# Patient Record
Sex: MALE | Race: OTHER | Employment: OTHER | URBAN - METROPOLITAN AREA
[De-identification: names, ages, dates, MRNs, and addresses within clinical notes are randomized per-mention and may not be internally consistent; named-entity substitution may affect disease eponyms.]

---

## 2017-01-14 ENCOUNTER — OFFICE VISIT (OUTPATIENT)
Dept: FAMILY MEDICINE CLINIC | Age: 22
End: 2017-01-14

## 2017-01-14 VITALS
HEART RATE: 72 BPM | HEIGHT: 60 IN | BODY MASS INDEX: 29.64 KG/M2 | TEMPERATURE: 98.6 F | SYSTOLIC BLOOD PRESSURE: 134 MMHG | DIASTOLIC BLOOD PRESSURE: 80 MMHG | WEIGHT: 151 LBS

## 2017-01-14 DIAGNOSIS — L70.0 PUSTULAR ACNE: Primary | ICD-10-CM

## 2017-01-14 RX ORDER — DOXYCYCLINE 100 MG/1
100 CAPSULE ORAL 2 TIMES DAILY
Qty: 60 CAP | Refills: 1 | Status: SHIPPED | OUTPATIENT
Start: 2017-01-14 | End: 2017-02-13

## 2017-01-14 NOTE — PATIENT INSTRUCTIONS
Benzoyl Peroxide (Para la piel)   Se [place] en la piel para tratar acné y otros problemas de la piel. Mio(s):Acne Foaming Face Wash, Acne Medication 10, Acne Medication 5, Acne-10, Acne-5, BPO 4% Creamy Wash Complete Pack, BPO 4% Gel, BPO 8% Creamy Wash Complete Pack, BPO 8% Gel, BPO Foaming Cloths, BPO-10, BPO-5, BenzEFoam, BenzEFoam Ultra, Benzac AC   Existen muchas otras marcas de ImaCor. Paty medicamento no debe ser usado cuando:   Paty medicamento no es adecuado para todas las personas. No lo use si usted ha tenido hussain reacción alérgica al peróxido de benzoilo. Himrod de usar paty medicamento:   [place], [place], [place], [facility], [place], [place], [facility]  · Cuando empiece a utilizar paty producto, apláquelo en 1 o 2 zonas afectadas pequeñas de la piel por 3 días. Si no ocurre [place], siga las instrucciones de la etiqueta del producto o utilice el medicamento bety se lo indique danielson médico.  · Paty medicamento es para usar únicamente en la piel. Lave el área de la piel inmediatamente que tenga cortadas o raspaduras. No permita que [employer] con shawn ojos, nariz o boca. · Dosis olvidada: Aplique la dosis lo más pronto posible. Si es ronnell hora para danielson próxima dosis, espere hasta entonces para aplicar shawn dosis regular. No aplique medicamento adicional para reponer la dosis olvidada. · Guarde el medicamento en un recipiente cerrado bajo temperatura ambiental, alejado del calor, la humedad y la asif directa. No lo congele. Medicamentos y [employer] que debe evitar:      Consulte con danielson médico o farmacéutico antes de usar cualquier medicamento, incluyendo los que compra sin receta médica, las vitaminas y los productos herbales. Precauciones imani el uso de paty medicamento:   · Informe a danielson médico si usted está embarazada o dando de lactar, o si alguna vez tuvo hussain reacción alérgica a un producto para el acné. · Paty medicamento puede decolorar el richmond o la ropa.   · Lori Pi medicamento puede hacer que danielson piel sea más sensible a la asif solar. Use un protector solar. Evite las lámparas y cámaras bronceadoras. · Llame a danielson médico si shawn síntomas no mejoran, o si Molly Bold. · Guarde todos los medicamentos fuera del alcance de los niños y nunca comparta shawn medicamentos con otras personas. Efectos secundarios que pueden presentarse imani el uso de vannesa medicamento:   Consulte inmediatamente con el médico si nota cualquiera de estos efectos secundarios:  · Reacción alérgica: picazón o ronchas, hinchazón en la nando o en las rosendo, hinchazón o cosquilleo en la boca o garganta, opresión en el pecho, dificultad para respirar  · Ardor, ampollas, hinchazón o descamación. · Desmayo  · Inflamación de los ojos, nando, labios o lengua  Consulte con el médico si nota los siguientes efectos secundarios menos graves:   · Ardor leve, picazón, enrojecimiento o sequedad de la piel  Consulte con el médico si nota otros efectos secundarios que chad son causados por vannesa medicamento. [place] a danielson médico para consejo médico sobre efectos secundarios. Usted puede reportar shawn efectos secundarios al FDA al [phone]. © 2016 [address] is for End User's use only and may not be sold, redistributed or otherwise used for commercial purposes. Esta información es sólo para uso en educación. Danielson intención no es darle un consejo médico sobre enfermedades o tratamientos. Colsulte con danielson Sonjia Reas farmacéutico antes de seguir cualquier régimen médico para saber si es seguro y efectivo para usted. Acné: Instrucciones de cuidado - [ Acne: Care Instructions ]  Instrucciones de cuidado  El acné es un problema de la piel que se manifiesta bety espinillas negras, puntos blancos y Jose Armando Charlene. Afecta con mayor frecuencia la nando, el jarett y la parte superior del cuerpo. El acné ocurre cuando la grasa y las células muertas de la piel ranjit los poros de la piel.   Por lo general, el acné comienza imani la adolescencia y, con frecuencia, dura hasta la edad [id number]. Hussain limpieza [employer] días controla la mayoría de los casos de acné leve. Si el tratamiento en casa no funciona, el médico podría recetar cremas, antibióticos o un medicamento más taqueria llamado isotretinoína. A veces las píldoras anticonceptivas ayudan a las mujeres que tienen un agravamiento mensual del acné. La atención de seguimiento es hussain parte clave de danielson tratamiento y seguridad. Asegúrese de hacer y acudir a todas las citas, y llame a danielson médico si está teniendo problemas. También es hussain buena idea saber los resultados de los exámenes y mantener hussain lista de los medicamentos que margaux. ¿Cómo puede cuidarse en el hogar? · Lávese la nando con suavidad 1 ó 2 veces al día con agua tibia (no caliente) y un jabón o limpiador suave. Siempre enjuáguese krys. · Use hussain loción o gel de venta cassi que contenga peróxido de benzoilo. Comience con hussain pequeña cantidad de peróxido de benzoilo al 2.5% y aumente la concentración según lo necesite. El peróxido de benzoilo funciona krys contra el acné, alireza blair vez tenga que usarlo hasta por 2 meses antes de que danielson acné comience a mejorar. · Aplíquese crema, loción o gel contra el acné en todos los lugares donde le salgan lisa, espinillas negras o puntos blancos, no sólo donde los tenga en vannesa momento. Siga las instrucciones atentamente. Si la piel se le seca y se descama demasiado o se enrojece y le duele, reduzca la cantidad. Para obtener los [place], aplíquese los medicamentos según las indicaciones. Trate de no saltarse ninguna dosis. · No se apriete ni se saque los lisa y las espinillas negras. [place] puede causar infección y cicatrices. · Use sólo maquillaje, protector solar y otros productos para el cuidado de la piel que no contengan aceite y no tapen los poros.   · Lávese el richmond a diario y trate de mantenerlo alejado de la nando y los hombros. Considere recogerse o cortarse el richmond. ¿Cuándo debe pedir ayuda? Preste especial atención a los cambios en danielson ghassan y asegúrese de comunicarse con danielson médico si:  · Ha intentado el tratamiento en casa por 6 a 8 semanas y danielson acné no mejora, o [id number]. Es posible que el médico necesite modificar danielson tratamiento. · Karina lisa se vuelven grandes y duros o se llenan de líquido. · Se madeleine cicatrices tras sanarse los lisa. · Se siente bozena o desesperanzado, le falta energía o tiene otras señales de depresión mientras margaux el medicamento recetado isotretinoína. · Comienza a tener otros síntomas, tales bety crecimiento de vello facial en las mujeres o dolor en los huesos y los músculos. ¿Dónde puede encontrar más información en inglés? Gennaro Safer a http://jigar-jeromy.info/. Escriba V108 en la búsqueda para aprender más acerca de \"Acné: Instrucciones de cuidado - [ Acne: Care Instructions ]. \"  Revisado: 5 febrero, 2016  Versión del contenido: 11.1  © 5822-1505 Healthwise, Incorporated. Las instrucciones de cuidado fueron adaptadas bajo licencia por [employer] (which disclaims liability or warranty for this information). Si usted tiene [place] afección médica o sobre estas instrucciones, siempre pregunte a danielson profesional de ghassan. Healthwise, Incorporated niega toda garantía o responsabilidad por danielson uso de esta información.

## 2017-01-14 NOTE — PROGRESS NOTES
Printed AVS, provided to pt and reviewed. Pt indicated understanding and had no questions. Told pt that rx's have been sent to pharmacy and they should be ready for  in approximately 2 hrs.reviewed all ordered medicaitons with the pt including the non- rx cream. Please present GoodRx. com coupon which we provide to your pharmacy to receive discounted gates. Candice Austin was the .  Ranjana Bowman RN

## 2017-01-14 NOTE — PROGRESS NOTES
Subjective:     Chief Complaint   Patient presents with    Acne        He  is a 24 y.o. male who presents for evaluation of acne. Original onset approx 4 years ago. Attempted relief with coconut oil, soaps and Abx injections. Notes poor relief from all above attempted remedies. Is not currently using any OTC creams/soaps. PMH: denies     Surg: denies     NKDA    ROS  Gen - no fever/chills  Resp - no dyspnea or cough  CV - no chest pain or ANDRE  Rest per HPI    No past medical history on file. No past surgical history on file. No current outpatient prescriptions on file prior to visit. No current facility-administered medications on file prior to visit. Objective:     Vitals:    01/14/17 1041   BP: 134/80   Pulse: 72   Temp: 98.6 °F (37 °C)   TempSrc: Oral   Weight: 151 lb (68.5 kg)   Height: 4' 11.84\" (1.52 m)       Physical Examination:  General appearance - alert, well appearing, and in no distress  Eyes -sclera anicteric  Neck - supple, no significant adenopathy, no thyromegaly  Chest - clear to auscultation, no wheezes, rales or rhonchi, symmetric air entry  Heart - normal rate, regular rhythm, normal S1, S2, no murmurs, rubs, clicks or gallops  Neurological - alert, oriented, no focal findings or movement disorder noted  Widespread comedic, pustular acne on face          L cheek      Assessment/ Plan:   Jensen Monet was seen today for acne. Diagnoses and all orders for this visit:    Pustular acne  -     doxycycline (VIBRAMYCIN) 100 mg capsule; Take 1 Cap by mouth two (2) times a day for 30 days. Start OTC benzoyl peroxide and face cream.     Given cost of Retin, will defer pending re-eval. Recommend Pt taper Abx at one month and see how face wash and cream maintains his acne. If it starts to worsen, resume PO Abx one cap daily. Re-eval in 6-8 weeks. Discussed cost of Abx and Pt was in agreement for [facility] at [facility].      I have discussed the diagnosis with the patient and the intended plan as seen in the above orders. The patient has received an after-visit summary and questions were answered concerning future plans. I have discussed medication side effects and warnings with the patient as well. The patient verbalizes understanding and agreement with the plan. Follow-up Disposition:  Return in about 2 months (around 3/14/2017), or if symptoms worsen or fail to improve.

## 2017-07-06 ENCOUNTER — HOSPITAL ENCOUNTER (EMERGENCY)
Age: 22
Discharge: HOME | End: 2017-07-06
Attending: EMERGENCY MEDICINE
Payer: SELF-PAY

## 2017-07-06 VITALS
OXYGEN SATURATION: 99 % | HEART RATE: 76 BPM | SYSTOLIC BLOOD PRESSURE: 138 MMHG | WEIGHT: 146 LBS | DIASTOLIC BLOOD PRESSURE: 97 MMHG | TEMPERATURE: 100.2 F | RESPIRATION RATE: 24 BRPM

## 2017-07-06 DIAGNOSIS — N34.2 URETHRITIS: Primary | ICD-10-CM

## 2017-07-06 LAB
APPEARANCE UR: CLEAR
BACTERIA URNS QL MICRO: NEGATIVE /HPF
BILIRUB UR QL: NEGATIVE
COLOR UR: YELLOW
EPITH CASTS URNS QL MICRO: NEGATIVE /LPF (ref 0–5)
GLUCOSE UR STRIP.AUTO-MCNC: NEGATIVE MG/DL
HGB UR QL STRIP: NEGATIVE
KETONES UR QL STRIP.AUTO: ABNORMAL MG/DL
LEUKOCYTE ESTERASE UR QL STRIP.AUTO: NEGATIVE
MUCOUS THREADS URNS QL MICRO: ABNORMAL /LPF
NITRITE UR QL STRIP.AUTO: NEGATIVE
PH UR STRIP: 7 [PH] (ref 5–8)
PROT UR STRIP-MCNC: ABNORMAL MG/DL
RBC #/AREA URNS HPF: ABNORMAL /HPF (ref 0–5)
SP GR UR REFRACTOMETRY: >1.03 (ref 1–1.03)
UROBILINOGEN UR QL STRIP.AUTO: 1 EU/DL (ref 0.2–1)
WBC URNS QL MICRO: ABNORMAL /HPF (ref 0–4)

## 2017-07-06 PROCEDURE — 74011250637 HC RX REV CODE- 250/637: Performed by: EMERGENCY MEDICINE

## 2017-07-06 PROCEDURE — 87081 CULTURE SCREEN ONLY: CPT | Performed by: EMERGENCY MEDICINE

## 2017-07-06 PROCEDURE — 99283 EMERGENCY DEPT VISIT LOW MDM: CPT

## 2017-07-06 PROCEDURE — 96372 THER/PROPH/DIAG INJ SC/IM: CPT

## 2017-07-06 PROCEDURE — 81001 URINALYSIS AUTO W/SCOPE: CPT | Performed by: EMERGENCY MEDICINE

## 2017-07-06 PROCEDURE — 87491 CHLMYD TRACH DNA AMP PROBE: CPT | Performed by: EMERGENCY MEDICINE

## 2017-07-06 PROCEDURE — 74011000250 HC RX REV CODE- 250: Performed by: EMERGENCY MEDICINE

## 2017-07-06 PROCEDURE — 74011250636 HC RX REV CODE- 250/636: Performed by: EMERGENCY MEDICINE

## 2017-07-06 RX ORDER — AZITHROMYCIN 250 MG/1
1000 TABLET, FILM COATED ORAL
Status: COMPLETED | OUTPATIENT
Start: 2017-07-06 | End: 2017-07-06

## 2017-07-06 RX ORDER — ACETAMINOPHEN 500 MG
1000 TABLET ORAL
Status: COMPLETED | OUTPATIENT
Start: 2017-07-06 | End: 2017-07-06

## 2017-07-06 RX ADMIN — ACETAMINOPHEN 1000 MG: 500 TABLET, COATED ORAL at 21:26

## 2017-07-06 RX ADMIN — CEFTRIAXONE 250 MG: 250 INJECTION, POWDER, FOR SOLUTION INTRAMUSCULAR; INTRAVENOUS at 21:18

## 2017-07-06 RX ADMIN — AZITHROMYCIN 1000 MG: 250 TABLET, FILM COATED ORAL at 21:18

## 2017-07-07 LAB
C TRACH RRNA SPEC QL NAA+PROBE: NEGATIVE
N GONORRHOEA RRNA SPEC QL NAA+PROBE: NEGATIVE
SPECIMEN SOURCE: NORMAL
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
## 2017-07-07 NOTE — ED NOTES
I have reviewed discharge instructions with the patient and friend. The patient and friend/ verbalized understanding. Patient armband removed and given to patient to take home. Patient was informed of the privacy risks if armband lost or stolen  There are no discharge medications for this patient.

## 2017-07-07 NOTE — DISCHARGE INSTRUCTIONS
Urethritis: Care Instructions  Your Care Instructions    Urethritis is an infection of the tube that takes urine from the bladder to the outside of the body. This tube is called the urethra. The infection is often caused by bacteria. This can happen if you have a sexually transmitted infection (STI). But a virus may also be a cause. Urethritis is usually treated with antibiotics. Most cases clear up with treatment. Proper treatment is very important. If you don't treat it, the infection can lead to lasting damage of the urethra. Other parts of the urinary system can also be damaged. Follow-up care is a key part of your treatment and safety. Be sure to make and go to all appointments, and call your doctor if you are having problems. It's also a good idea to know your test results and keep a list of the medicines you take. How can you care for yourself at home? · If your doctor prescribed antibiotics, take them as directed. Do not stop taking them just because you feel better. You need to take the full course of antibiotics. · Take an over-the-counter pain medicine, such as acetaminophen (Tylenol), ibuprofen (Advil, Motrin), or naproxen (Aleve), if needed. Be safe with medicines. Read and follow all instructions on the label. · Do not take two or more pain medicines at the same time unless the doctor told you to. Many pain medicines have acetaminophen, which is Tylenol. Too much acetaminophen (Tylenol) can be harmful. · Your doctor may have you take phenazopyridine (Pyridium). This is a pain medicine for the urinary tract. It can turn your urine a deep red-orange. This is normal. Call your doctor if you think you are having a problem with your medicine. · Do not have sex until you are done with treatment. If you do have sex, be sure to use a condom. Your sex partner or partners should be tested too if your urethritis was caused by an STI.   · If your infection was caused by an injury or chemicals, avoid those things if you can. When should you call for help? Call your doctor now or seek immediate medical care if:  · You can't urinate. · You have symptoms of a urinary infection. For example:  ¨ You have blood or pus in your urine. ¨ You have pain in your back just below your rib cage. This is called flank pain. ¨ You have a fever, chills, or body aches. ¨ It hurts to urinate. ¨ You have groin or belly pain. · You have a hard time urinating when your bladder is full. · You notice mental changes or feel confused. Watch closely for changes in your health, and be sure to contact your doctor if:  · You do not get better as expected. Where can you learn more? Go to http://mateo-mercedes.info/. Enter P099 in the search box to learn more about \"Urethritis: Care Instructions. \"  Current as of: August 12, 2016  Content Version: 11.3  © 0882-4073 [employer]. Care instructions adapted under license by [employer] (which disclaims liability or warranty for this information). If you have questions about a medical condition or this instruction, always ask your healthcare professional. [address] any warranty or liability for your use of this information. Uretritis: Instrucciones de cuidado - [ Urethritis: Care Instructions ]  Instrucciones de cuidado    La uretritis es pam infección del tubo que lleva la orina desde la vejiga hacia afuera del organismo. [place] tubo se llama uretra. La infección con frecuencia es causada por bacterias. [place] puede ocurrir si usted tiene pam infección de transmisión sexual (STI, por beka siglas en inglés). [place] un virus también puede ser [employer] causas. La uretritis se suele tratar con antibióticos. La mayoría de los casos se curan con tratamiento. Es muy importante hacer el tratamiento [place]. Si no se trata, la infección puede causar daños duraderos en la uretra.  Otras partes del aparato urinario [place] pueden dañarse. La atención de seguimiento es pam parte clave de steele tratamiento y seguridad. Asegúrese de hacer y acudir a todas las citas, y llame a steele médico si está teniendo problemas. También es pam buena idea saber los resultados de los exámenes y mantener pam lista de los medicamentos que moose. ¿Cómo puede cuidarse en el hogar? · Si steele médico le recetó antibióticos, tómelos según las indicaciones. No deje de tomarlos por el hecho de sentirse mejor. Debe jenelle todos los antibióticos hasta terminarlos. · Nordic un analgésico (medicamento para el dolor) de venta mayo, festus acetaminofén (Tylenol), ibuprofeno (Advil, Motrin) o naproxeno (Aleve), si es necesario. Use los medicamentos con tiff. Lisseth y siga todas las instrucciones de la [place]. · No tome dos o más analgésicos al mismo tiempo a menos que el médico se lo haya indicado. Muchos analgésicos contienen acetaminofén, es decir, Tylenol. El exceso de acetaminofén (Tylenol) puede ser dañino. · Es posible que steele médico le pida que tome fenazopiridina (Pyridium). [place] es un analgésico para las vías urinarias. Puede tornar steele orina de un color rojizo anaranjado oscuro. [place] es normal. Llame a steele médico si enoch estar teniendo problemas con steele medicamento. · Evite tener relaciones sexuales hasta finalizar el tratamiento. Si tiene [employer], asegúrese de utilizar un condón. Steele daniel o parejas sexuales también deben hacerse pam prueba de detección si steele uretritis fue provocada por pam STI. · Si la causa de la infección es pam lesión o sustancias químicas, evite esas cosas si puede. ¿Cuándo debe pedir ayuda? Llame a steele médico ahora mismo o busque atención médica inmediata si:  · No puede orinar. · Presenta síntomas de pam infección urinaria. Por ejemplo:  ¨ Tiene candace o pus en la orina. ¨ Tiene dolor de espalda marjorie debajo de las [place].  [place] se llama dolor en el flanco.  ¨ Tiene fiebre, escalofríos o akil por todo el cuerpo. ¨ Siente dolor al David. ¨ Tiene dolor en el abdomen o la alexander. · Tiene dificultades para orinar cuando tiene la vejiga llena. · Advierte cambios mentales o se siente confuso. Preste especial atención a los cambios en steele octavia y asegúrese de comunicarse con steele médico si:  · No mejora festus se esperaba. ¿Dónde puede encontrar más información en inglés? Eliana Diaz a http://mateo-mercedes.info/. [place] L220 en la búsqueda para aprender más acerca de \"Uretritis: Instrucciones de cuidado - [ Urethritis: Care Instructions ]. \"  Revisado: 12 agosto, 2016  Versión del contenido: 11.3  © 2658-5061 Healthwise, Incorporated. Las instrucciones de cuidado fueron adaptadas bajo licencia por [employer] (which disclaims liability or warranty for this information). Si usted tiene [place] afección médica o sobre estas instrucciones, siempre pregunte a steele profesional de octavia. Healthwise, Incorporated niega toda garantía o responsabilidad por steele uso de esta información.
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
## 2017-07-08 LAB
B-HEM STREP THROAT QL CULT: NEGATIVE
BACTERIA SPEC CULT: NORMAL
SERVICE CMNT-IMP: NORMAL

## 2019-10-03 ENCOUNTER — APPOINTMENT (OUTPATIENT)
Dept: GENERAL RADIOLOGY | Age: 24
End: 2019-10-03
Attending: EMERGENCY MEDICINE
Payer: SELF-PAY

## 2019-10-03 ENCOUNTER — HOSPITAL ENCOUNTER (OUTPATIENT)
Age: 24
Setting detail: OBSERVATION
LOS: 1 days | Discharge: HOME | End: 2019-10-05
Attending: EMERGENCY MEDICINE | Admitting: HOSPITALIST
Payer: SELF-PAY

## 2019-10-03 DIAGNOSIS — R65.10 SIRS (SYSTEMIC INFLAMMATORY RESPONSE SYNDROME) (HCC): Primary | ICD-10-CM

## 2019-10-03 DIAGNOSIS — Z28.9 VACCINATION NOT CARRIED OUT: ICD-10-CM

## 2019-10-03 DIAGNOSIS — R50.9 FEVER, UNSPECIFIED FEVER CAUSE: ICD-10-CM

## 2019-10-03 DIAGNOSIS — L50.9 URTICARIAL RASH: ICD-10-CM

## 2019-10-03 LAB
ALBUMIN SERPL-MCNC: 4.3 G/DL (ref 3.5–5)
ALBUMIN/GLOB SERPL: 1.2 {RATIO} (ref 1.1–2.2)
ALP SERPL-CCNC: 121 U/L (ref 45–117)
ALT SERPL-CCNC: 22 U/L (ref 12–78)
ANION GAP SERPL CALC-SCNC: 9 MMOL/L (ref 5–15)
AST SERPL-CCNC: 10 U/L (ref 15–37)
BASOPHILS # BLD: 0 K/UL (ref 0–0.1)
BASOPHILS NFR BLD: 0 % (ref 0–1)
BILIRUB SERPL-MCNC: 0.7 MG/DL (ref 0.2–1)
BUN SERPL-MCNC: 11 MG/DL (ref 6–20)
BUN/CREAT SERPL: 12 (ref 12–20)
CALCIUM SERPL-MCNC: 9.1 MG/DL (ref 8.5–10.1)
CHLORIDE SERPL-SCNC: 103 MMOL/L (ref 97–108)
CO2 SERPL-SCNC: 26 MMOL/L (ref 21–32)
COMMENT, HOLDF: NORMAL
CREAT SERPL-MCNC: 0.93 MG/DL (ref 0.7–1.3)
DIFFERENTIAL METHOD BLD: ABNORMAL
EOSINOPHIL # BLD: 0 K/UL (ref 0–0.4)
EOSINOPHIL NFR BLD: 0 % (ref 0–7)
ERYTHROCYTE [DISTWIDTH] IN BLOOD BY AUTOMATED COUNT: 12.1 % (ref 11.5–14.5)
GLOBULIN SER CALC-MCNC: 3.5 G/DL (ref 2–4)
GLUCOSE SERPL-MCNC: 102 MG/DL (ref 65–100)
HCT VFR BLD AUTO: 45.1 % (ref 36.6–50.3)
HGB BLD-MCNC: 15.4 G/DL (ref 12.1–17)
IMM GRANULOCYTES # BLD AUTO: 0.1 K/UL (ref 0–0.04)
IMM GRANULOCYTES NFR BLD AUTO: 0 % (ref 0–0.5)
LACTATE BLD-SCNC: 0.88 MMOL/L (ref 0.4–2)
LYMPHOCYTES # BLD: 1.8 K/UL (ref 0.8–3.5)
LYMPHOCYTES NFR BLD: 12 % (ref 12–49)
MCH RBC QN AUTO: 30.2 PG (ref 26–34)
MCHC RBC AUTO-ENTMCNC: 34.1 G/DL (ref 30–36.5)
MCV RBC AUTO: 88.4 FL (ref 80–99)
MONOCYTES # BLD: 0.8 K/UL (ref 0–1)
MONOCYTES NFR BLD: 5 % (ref 5–13)
NEUTS SEG # BLD: 12.9 K/UL (ref 1.8–8)
NEUTS SEG NFR BLD: 83 % (ref 32–75)
NRBC # BLD: 0 K/UL (ref 0–0.01)
NRBC BLD-RTO: 0 PER 100 WBC
PLATELET # BLD AUTO: 283 K/UL (ref 150–400)
PMV BLD AUTO: 9.4 FL (ref 8.9–12.9)
POTASSIUM SERPL-SCNC: 3.4 MMOL/L (ref 3.5–5.1)
PROT SERPL-MCNC: 7.8 G/DL (ref 6.4–8.2)
RBC # BLD AUTO: 5.1 M/UL (ref 4.1–5.7)
SAMPLES BEING HELD,HOLD: NORMAL
SODIUM SERPL-SCNC: 138 MMOL/L (ref 136–145)
WBC # BLD AUTO: 15.6 K/UL (ref 4.1–11.1)

## 2019-10-03 PROCEDURE — 36415 COLL VENOUS BLD VENIPUNCTURE: CPT

## 2019-10-03 PROCEDURE — 65270000029 HC RM PRIVATE

## 2019-10-03 PROCEDURE — 85025 COMPLETE CBC W/AUTO DIFF WBC: CPT

## 2019-10-03 PROCEDURE — 96374 THER/PROPH/DIAG INJ IV PUSH: CPT

## 2019-10-03 PROCEDURE — 96375 TX/PRO/DX INJ NEW DRUG ADDON: CPT

## 2019-10-03 PROCEDURE — 87040 BLOOD CULTURE FOR BACTERIA: CPT

## 2019-10-03 PROCEDURE — 71046 X-RAY EXAM CHEST 2 VIEWS: CPT

## 2019-10-03 PROCEDURE — 80053 COMPREHEN METABOLIC PANEL: CPT

## 2019-10-03 PROCEDURE — 83605 ASSAY OF LACTIC ACID: CPT

## 2019-10-03 PROCEDURE — 99283 EMERGENCY DEPT VISIT LOW MDM: CPT

## 2019-10-03 RX ORDER — ACETAMINOPHEN 500 MG
1000 TABLET ORAL
Status: COMPLETED | OUTPATIENT
Start: 2019-10-03 | End: 2019-10-04

## 2019-10-03 RX ORDER — LEVOFLOXACIN 5 MG/ML
750 INJECTION, SOLUTION INTRAVENOUS
Status: DISCONTINUED | OUTPATIENT
Start: 2019-10-03 | End: 2019-10-04

## 2019-10-03 RX ORDER — DIPHENHYDRAMINE HYDROCHLORIDE 50 MG/ML
25 INJECTION, SOLUTION INTRAMUSCULAR; INTRAVENOUS
Status: COMPLETED | OUTPATIENT
Start: 2019-10-03 | End: 2019-10-04

## 2019-10-03 NOTE — ED TRIAGE NOTES
Pt has red, raised rash to entirety of body. Itchy, burning. No airway involvement. Took Benadryl 50mg at 1500 and applied Cortisone cream with no relief. Reports eating a new \"Avuxi\" and symptoms began after.   Reports NO immunizations, born in [place]

## 2019-10-04 PROBLEM — R21 RASH: Status: ACTIVE | Noted: 2019-10-04

## 2019-10-04 LAB
AMPHET UR QL SCN: NEGATIVE
APPEARANCE UR: CLEAR
ATRIAL RATE: 101 BPM
BACTERIA URNS QL MICRO: NEGATIVE /HPF
BARBITURATES UR QL SCN: NEGATIVE
BASOPHILS # BLD: 0 K/UL (ref 0–0.1)
BASOPHILS NFR BLD: 0 % (ref 0–1)
BENZODIAZ UR QL: NEGATIVE
BILIRUB UR QL: NEGATIVE
CALCULATED P AXIS, ECG09: 63 DEGREES
CALCULATED R AXIS, ECG10: 100 DEGREES
CALCULATED T AXIS, ECG11: 21 DEGREES
CANNABINOIDS UR QL SCN: NEGATIVE
COCAINE UR QL SCN: NEGATIVE
COLOR UR: NORMAL
COMMENT, HOLDF: NORMAL
CRP SERPL-MCNC: 6.64 MG/DL (ref 0–0.6)
DIAGNOSIS, 93000: NORMAL
DIFFERENTIAL METHOD BLD: ABNORMAL
DRUG SCRN COMMENT,DRGCM: NORMAL
EOSINOPHIL # BLD: 0 K/UL (ref 0–0.4)
EOSINOPHIL NFR BLD: 0 % (ref 0–7)
EPITH CASTS URNS QL MICRO: NORMAL /LPF
ERYTHROCYTE [DISTWIDTH] IN BLOOD BY AUTOMATED COUNT: 12.2 % (ref 11.5–14.5)
ERYTHROCYTE [SEDIMENTATION RATE] IN BLOOD: 2 MM/HR (ref 0–15)
GLUCOSE UR STRIP.AUTO-MCNC: NEGATIVE MG/DL
HCT VFR BLD AUTO: 44.2 % (ref 36.6–50.3)
HGB BLD-MCNC: 15 G/DL (ref 12.1–17)
HGB UR QL STRIP: NEGATIVE
HIV 1+2 AB+HIV1 P24 AG SERPL QL IA: NONREACTIVE
HIV12 RESULT COMMENT, HHIVC: NORMAL
HYALINE CASTS URNS QL MICRO: NORMAL /LPF (ref 0–5)
IMM GRANULOCYTES # BLD AUTO: 0.1 K/UL (ref 0–0.04)
IMM GRANULOCYTES NFR BLD AUTO: 1 % (ref 0–0.5)
KETONES UR QL STRIP.AUTO: NEGATIVE MG/DL
LEUKOCYTE ESTERASE UR QL STRIP.AUTO: NEGATIVE
LYMPHOCYTES # BLD: 1.3 K/UL (ref 0.8–3.5)
LYMPHOCYTES NFR BLD: 11 % (ref 12–49)
MCH RBC QN AUTO: 30.5 PG (ref 26–34)
MCHC RBC AUTO-ENTMCNC: 33.9 G/DL (ref 30–36.5)
MCV RBC AUTO: 89.8 FL (ref 80–99)
METHADONE UR QL: NEGATIVE
MONOCYTES # BLD: 0.2 K/UL (ref 0–1)
MONOCYTES NFR BLD: 1 % (ref 5–13)
NEUTS SEG # BLD: 10.1 K/UL (ref 1.8–8)
NEUTS SEG NFR BLD: 87 % (ref 32–75)
NITRITE UR QL STRIP.AUTO: NEGATIVE
NRBC # BLD: 0 K/UL (ref 0–0.01)
NRBC BLD-RTO: 0 PER 100 WBC
OPIATES UR QL: NEGATIVE
P-R INTERVAL, ECG05: 136 MS
PCP UR QL: NEGATIVE
PH UR STRIP: 6.5 [PH] (ref 5–8)
PLATELET # BLD AUTO: 271 K/UL (ref 150–400)
PMV BLD AUTO: 9.6 FL (ref 8.9–12.9)
PROCALCITONIN SERPL-MCNC: <0.1 NG/ML
PROT UR STRIP-MCNC: NEGATIVE MG/DL
Q-T INTERVAL, ECG07: 330 MS
QRS DURATION, ECG06: 96 MS
QTC CALCULATION (BEZET), ECG08: 427 MS
RBC # BLD AUTO: 4.92 M/UL (ref 4.1–5.7)
RBC #/AREA URNS HPF: NORMAL /HPF (ref 0–5)
SAMPLES BEING HELD,HOLD: NORMAL
SP GR UR REFRACTOMETRY: 1.01 (ref 1–1.03)
TROPONIN I SERPL-MCNC: <0.05 NG/ML
UR CULT HOLD, URHOLD: NORMAL
UR CULT HOLD, URHOLD: NORMAL
UROBILINOGEN UR QL STRIP.AUTO: 1 EU/DL (ref 0.2–1)
VENTRICULAR RATE, ECG03: 101 BPM
WBC # BLD AUTO: 11.6 K/UL (ref 4.1–11.1)
WBC URNS QL MICRO: NORMAL /HPF (ref 0–4)

## 2019-10-04 PROCEDURE — 74011636637 HC RX REV CODE- 636/637: Performed by: INTERNAL MEDICINE

## 2019-10-04 PROCEDURE — 84145 PROCALCITONIN (PCT): CPT

## 2019-10-04 PROCEDURE — 99218 HC RM OBSERVATION: CPT

## 2019-10-04 PROCEDURE — 96361 HYDRATE IV INFUSION ADD-ON: CPT

## 2019-10-04 PROCEDURE — 84484 ASSAY OF TROPONIN QUANT: CPT

## 2019-10-04 PROCEDURE — 74011250637 HC RX REV CODE- 250/637: Performed by: INTERNAL MEDICINE

## 2019-10-04 PROCEDURE — 85652 RBC SED RATE AUTOMATED: CPT

## 2019-10-04 PROCEDURE — 74011250637 HC RX REV CODE- 250/637: Performed by: HOSPITALIST

## 2019-10-04 PROCEDURE — 96374 THER/PROPH/DIAG INJ IV PUSH: CPT

## 2019-10-04 PROCEDURE — 80307 DRUG TEST PRSMV CHEM ANLYZR: CPT

## 2019-10-04 PROCEDURE — 74011250636 HC RX REV CODE- 250/636: Performed by: EMERGENCY MEDICINE

## 2019-10-04 PROCEDURE — 74011250637 HC RX REV CODE- 250/637: Performed by: EMERGENCY MEDICINE

## 2019-10-04 PROCEDURE — 87389 HIV-1 AG W/HIV-1&-2 AB AG IA: CPT

## 2019-10-04 PROCEDURE — 86140 C-REACTIVE PROTEIN: CPT

## 2019-10-04 PROCEDURE — 93005 ELECTROCARDIOGRAM TRACING: CPT

## 2019-10-04 PROCEDURE — 36415 COLL VENOUS BLD VENIPUNCTURE: CPT

## 2019-10-04 PROCEDURE — 85025 COMPLETE CBC W/AUTO DIFF WBC: CPT

## 2019-10-04 PROCEDURE — 81001 URINALYSIS AUTO W/SCOPE: CPT

## 2019-10-04 PROCEDURE — 74011250636 HC RX REV CODE- 250/636: Performed by: HOSPITALIST

## 2019-10-04 PROCEDURE — 96375 TX/PRO/DX INJ NEW DRUG ADDON: CPT

## 2019-10-04 PROCEDURE — 87536 HIV-1 QUANT&REVRSE TRNSCRPJ: CPT

## 2019-10-04 RX ORDER — ACETAMINOPHEN 325 MG/1
650 TABLET ORAL
Status: DISCONTINUED | OUTPATIENT
Start: 2019-10-04 | End: 2019-10-05 | Stop reason: HOSPADM

## 2019-10-04 RX ORDER — SODIUM CHLORIDE 9 MG/ML
100 INJECTION, SOLUTION INTRAVENOUS CONTINUOUS
Status: DISCONTINUED | OUTPATIENT
Start: 2019-10-04 | End: 2019-10-05 | Stop reason: HOSPADM

## 2019-10-04 RX ORDER — SODIUM CHLORIDE 0.9 % (FLUSH) 0.9 %
5-40 SYRINGE (ML) INJECTION EVERY 8 HOURS
Status: DISCONTINUED | OUTPATIENT
Start: 2019-10-04 | End: 2019-10-05 | Stop reason: HOSPADM

## 2019-10-04 RX ORDER — ONDANSETRON 2 MG/ML
4 INJECTION INTRAMUSCULAR; INTRAVENOUS
Status: DISCONTINUED | OUTPATIENT
Start: 2019-10-04 | End: 2019-10-05 | Stop reason: HOSPADM

## 2019-10-04 RX ORDER — SODIUM CHLORIDE 0.9 % (FLUSH) 0.9 %
5-40 SYRINGE (ML) INJECTION AS NEEDED
Status: DISCONTINUED | OUTPATIENT
Start: 2019-10-04 | End: 2019-10-05 | Stop reason: HOSPADM

## 2019-10-04 RX ORDER — DIPHENHYDRAMINE HCL 50 MG
50 CAPSULE ORAL ONCE
Status: COMPLETED | OUTPATIENT
Start: 2019-10-04 | End: 2019-10-04

## 2019-10-04 RX ORDER — POTASSIUM CHLORIDE 750 MG/1
40 TABLET, FILM COATED, EXTENDED RELEASE ORAL
Status: COMPLETED | OUTPATIENT
Start: 2019-10-04 | End: 2019-10-04

## 2019-10-04 RX ORDER — CAMPHOR
SPIRIT TOPICAL 4 TIMES DAILY
Status: DISCONTINUED | OUTPATIENT
Start: 2019-10-04 | End: 2019-10-05 | Stop reason: HOSPADM

## 2019-10-04 RX ORDER — DIPHENHYDRAMINE HCL 25 MG
25 CAPSULE ORAL
Status: DISCONTINUED | OUTPATIENT
Start: 2019-10-04 | End: 2019-10-05 | Stop reason: HOSPADM

## 2019-10-04 RX ORDER — PREDNISONE 20 MG/1
20 TABLET ORAL
Status: DISCONTINUED | OUTPATIENT
Start: 2019-10-04 | End: 2019-10-05 | Stop reason: HOSPADM

## 2019-10-04 RX ADMIN — DIPHENHYDRAMINE HYDROCHLORIDE 50 MG: 50 CAPSULE ORAL at 08:41

## 2019-10-04 RX ADMIN — SODIUM CHLORIDE 100 ML/HR: 900 INJECTION, SOLUTION INTRAVENOUS at 13:46

## 2019-10-04 RX ADMIN — FERRIC OXIDE RED: 8; 8 LOTION TOPICAL at 18:09

## 2019-10-04 RX ADMIN — ACETAMINOPHEN 1000 MG: 500 TABLET ORAL at 00:24

## 2019-10-04 RX ADMIN — FERRIC OXIDE RED: 8; 8 LOTION TOPICAL at 13:47

## 2019-10-04 RX ADMIN — FERRIC OXIDE RED: 8; 8 LOTION TOPICAL at 22:00

## 2019-10-04 RX ADMIN — METHYLPREDNISOLONE SODIUM SUCCINATE 125 MG: 125 INJECTION, POWDER, FOR SOLUTION INTRAMUSCULAR; INTRAVENOUS at 00:23

## 2019-10-04 RX ADMIN — SODIUM CHLORIDE 100 ML/HR: 900 INJECTION, SOLUTION INTRAVENOUS at 03:24

## 2019-10-04 RX ADMIN — DIPHENHYDRAMINE HYDROCHLORIDE 25 MG: 25 CAPSULE ORAL at 18:09

## 2019-10-04 RX ADMIN — DIPHENHYDRAMINE HYDROCHLORIDE 25 MG: 50 INJECTION, SOLUTION INTRAMUSCULAR; INTRAVENOUS at 00:24

## 2019-10-04 RX ADMIN — FERRIC OXIDE RED: 8; 8 LOTION TOPICAL at 10:51

## 2019-10-04 RX ADMIN — PREDNISONE 20 MG: 20 TABLET ORAL at 08:41

## 2019-10-04 RX ADMIN — POTASSIUM CHLORIDE 40 MEQ: 750 TABLET, FILM COATED, EXTENDED RELEASE ORAL at 00:24

## 2019-10-04 RX ADMIN — SODIUM CHLORIDE 1000 ML: 900 INJECTION, SOLUTION INTRAVENOUS at 00:23

## 2019-10-04 NOTE — ROUTINE PROCESS
TRANSFER - OUT REPORT: 
 
Verbal report given to Art Alvarez RN(name) on Redgie Monday  being transferred to [facility](unit) for routine progression of care Report consisted of patients Situation, Background, Assessment and  
Recommendations(SBAR). Information from the following report(s) SBAR was reviewed with the receiving nurse. Lines:  
Peripheral IV 10/03/19 Left Antecubital (Active) Site Assessment Clean, dry, & intact 10/3/2019  8:32 PM  
Phlebitis Assessment 0 10/3/2019  8:32 PM  
Infiltration Assessment 0 10/3/2019  8:32 PM  
Dressing Status Clean, dry, & intact 10/3/2019  8:32 PM  
Dressing Type Transparent 10/3/2019  8:32 PM  
Hub Color/Line Status Pink;Capped;Flushed;Patent 10/3/2019  8:32 PM  
Action Taken Blood drawn 10/3/2019  8:32 PM  
   
Peripheral IV 10/03/19 Right Antecubital (Active) Site Assessment Clean, dry, & intact 10/3/2019  8:33 PM  
Phlebitis Assessment 0 10/3/2019  8:33 PM  
Infiltration Assessment 0 10/3/2019  8:33 PM  
Dressing Status Clean, dry, & intact 10/3/2019  8:33 PM  
Dressing Type Transparent 10/3/2019  8:33 PM  
Hub Color/Line Status Pink;Capped;Flushed;Patent 10/3/2019  8:33 PM  
Action Taken Blood drawn 10/3/2019  8:33 PM  
  
 
Opportunity for questions and clarification was provided.

## 2019-10-04 NOTE — ED PROVIDER NOTES
25 y.o. male with no significant past medical history who presents from home with chief complaint of rash. Pt reports the onset of a diffuse, pruritic burning rash last night. He notes assocaited low grade fever at home this evening of 99 F. He took 2 tablets of Benadryl at 1500 this afternoon without any relief. He denies any new soaps, lotions, detergents, medications to contribute to the rash. Pt denies any known contact with recent rashes. He works in [employer]. Pt additionally notes he is from [place] and moved to the [address] [place] [address] 3 years ago, denies any travel since moving to [address] [place] [address]. He denies any immunizations. Pt specifically denies any shortness of breath, chest pain, abdominal pain, nausea, vomiting. There are no other acute medical concerns at this time. Social Hx: negative tobacco use, occasional EtOH use, negative Illicit Drug use    PCP: Unknown, Provider    Note written by Leonid Avitia, as dictated by Timur Moreno MD 10:40 PM    The history is provided by the patient. The history is limited by a language barrier. A  was used. Past Medical History:   Diagnosis Date    Rosacea        History reviewed. No pertinent surgical history. History reviewed. No pertinent family history. Social History     Socioeconomic History    Marital status: SINGLE     Spouse name: Not on file    Number of children: Not on file    Years of education: Not on file    Highest education level: Not on file   Occupational History    Not on file   Social Needs    Financial resource strain: Not on file    Food insecurity:     Worry: Not on file     Inability: Not on file    Transportation needs:     Medical: Not on file     Non-medical: Not on file   Tobacco Use    Smoking status: Former Smoker     Packs/day: 0.25    Smokeless tobacco: Never Used   Substance and Sexual Activity    Alcohol use: Yes     Comment:  Occ    Drug use: No    Sexual activity: Not on file Lifestyle    Physical activity:     Days per week: Not on file     Minutes per session: Not on file    Stress: Not on file   Relationships    Social connections:     Talks on phone: Not on file     Gets together: Not on file     Attends [employer] service: Not on file     Active member of club or organization: Not on file     Attends meetings of clubs or organizations: Not on file     Relationship status: Not on file    Intimate partner violence:     Fear of current or ex partner: Not on file     Emotionally abused: Not on file     Physically abused: Not on file     Forced sexual activity: Not on file   Other Topics Concern    Not on file   Social History Narrative    Not on file         ALLERGIES: Patient has no known allergies. Review of Systems   Constitutional: Positive for fever. Negative for appetite change. HENT: Negative for congestion, nosebleeds and sore throat. Eyes: Negative for discharge. Respiratory: Negative for cough and shortness of breath. Cardiovascular: Negative for chest pain. Gastrointestinal: Negative for abdominal pain, diarrhea, nausea and vomiting. Genitourinary: Negative for dysuria. Musculoskeletal: Negative. Skin: Positive for rash. Neurological: Negative for weakness and headaches. Hematological: Negative for adenopathy. Psychiatric/Behavioral: Negative. All other systems reviewed and are negative. Vitals:    10/03/19 1959   BP: 124/76   Pulse: (!) 122   Resp: 18   Temp: 99.6 °F (37.6 °C)   SpO2: 99%   Weight: 70.7 kg (155 lb 13.8 oz)   Height: 5' 6\" (1.676 m)            Physical Exam   Constitutional: He is oriented to person, place, and time. He appears well-developed and well-nourished. HENT:   Head: Normocephalic and atraumatic. Mouth/Throat: Oropharynx is clear and moist.   Eyes: Conjunctivae are normal.   Neck: Normal range of motion. Neck supple. Cardiovascular: Normal rate, regular rhythm and normal heart sounds. Pulmonary/Chest: Effort normal and breath sounds normal.   Abdominal: Soft. Bowel sounds are normal. There is no tenderness. Musculoskeletal: Normal range of motion. He exhibits no edema or tenderness. Neurological: He is alert and oriented to person, place, and time. Skin: Skin is warm and dry. Rash noted. Rash is urticarial (diffuse). Psychiatric: He has a normal mood and affect. His behavior is normal.   Nursing note and vitals reviewed. Note written by Leonid Montano, as dictated by Cheri Lucas MD 10:40 PM     MDM       Procedures    ED EKG interpretation:  Rhythm: sinus tachycardia; and regular . Rate (approx.): 101; Axis: normal; P wave: normal; QRS interval: normal ; ST/T wave: normal; interpreted by Cheri Lucas MD, ED MD.    CONSULT NOTE:  11:14 PM   Cheri Lucas MD spoke with Dr. Christopher Jean, Consult for ID. Discussed available diagnostic tests and clinical findings. He does not recommend any additional testing at this time. Hospitalist Shell for Admission  11:34 PM - Dr. Ab Salcido    ED Room Number: ER05/05  Patient Name and age:  Sav Pete 25 y.o.  male  Working Diagnosis:   1. SIRS (systemic inflammatory response syndrome) (HCC)    2. Fever, unspecified fever cause    3. Vaccination not carried out    4. Urticarial rash      Readmission: no  Isolation Requirements:  no  Recommended Level of Care:  med/surg  Code Status:  Full Code  Department:[facility] Adult ED - 21     Discussed with Dr. Josefa Linares - He saw the patient. The patient then told him he had been taking Cefuroxime from [place]. Dr. Josefa Linares wants to hold off on antibiotics for now as the patient may be having reacting to Cefuroxime.

## 2019-10-04 NOTE — PROGRESS NOTES
Hospitalist Progress Note  Vineet Falcon MD  Answering service: [phone] from in house phone        Date of Service:  10/4/2019  NAME:  Gagan Cabrera  :  1995  MRN:  [id number]  PCP: Unknown, Provider    Chief Complaint:   Chief Complaint   Patient presents with   Torrey Leavitt Rash    Fever         Admission Summary:     Gagan Cabrera is a 25 y.o. male who presented with rash, fevers    Interval history / Subjective:   Patient seen for Follow up of CC: rash  Patient seen and examined by the bedside, Labs, images and notes reviewed  Complains of itching all over his body specially at the back  Rash is improving, hemodynamics have improved. comfortable, Denies any chest pain, abdominal pain, fevers, chills. No N/V/D  Discussed with nursing staff, no acute issues overnight, orders reviewed. Assessment & Plan:     Generalized rash: this appears urticarial and given his work as a  and recent antibiotic (Anmax from [place]) use raises concern for an allergic reaction. He does not appear toxic/infected. Possible viral exanthem.  -will hold IV antibiotics. F/u blood cultures  -pending HIV Ab and PCR; verbal consent obtained  -responded well to IV steroids and Benadryl  - will place on PO and monitor response. - clinical images taken with patient's consent and placed in the chart.     SIRS without an infection source  Check CRP, pro calcitonin level and sed rate  BC x 2 pending     Unvaccinated status    Code status: Full Code    DVT prophylaxis: none    Care Plan discussed with: Patient/Family and Nurse    Disposition: TBD    Hospital Problems  Never Reviewed          Codes Class Noted POA    Rash ICD-10-CM: R21  ICD-9-CM: 782.1  10/4/2019 Unknown        [facility]ic inflammatory response [facility]) [facility] ICD-10-CM: R65.10  ICD-9-CM: 995.90  10/3/2019 Unknown                Review of Systems:   A comprehensive review of systems was negative.          Physical Examination: General appearance: alert, cooperative, no distress, appears stated age  Head: Normocephalic, without obvious abnormality, atraumatic  Throat: normal findings: buccal mucosa normal  Lungs: clear to auscultation bilaterally, no wheezing, rales, labored breathing  Heart: RRR, NM  Abdomen: soft, NT, NG  Extremities: extremities normal, atraumatic, no cyanosis or edema  Skin: diffuse urticarial rash, predominantly on trunk , which patient states that is improving. Severe acne on the face  Neurologic: Alert and oriented X 3, normal strength and tone. Vital Signs:    Last 24hrs VS reviewed since prior progress note. Most recent are:    Visit Vitals  BP 98/47   Pulse 99   Temp 100.3 °F (37.9 °C)   Resp 19   Ht 5' 6\" (1.676 m)   Wt 70.7 kg (155 lb 13.8 oz)   SpO2 96%   BMI 25.16 kg/m²       No intake or output data in the 24 hours ending 10/04/19 0824     Tmax:  Temp (24hrs), Av °F (37.8 °C), Min:99.6 °F (37.6 °C), Max:100.3 °F (37.9 °C)      Data Review:   Data reviewed by myself:  Xr Chest Pa Lat    Result Date: 10/3/2019  INDICATION:   fever COMPARISON: None FINDINGS: Frontal and lateral views of the chest demonstrate a normal cardiomediastinal silhouette. The lungs are adequately expanded. There is no edema, effusion, consolidation, or pneumothorax. The osseous structures are unremarkable. IMPRESSION: No acute process.      No results found for: SDES  Lab Results   Component Value Date/Time    Culture result: NO GROWTH AFTER 9 HOURS 10/03/2019 08:30 PM    Culture result: NO BETA HEMOLYTIC STREPTOCOCCUS GROUP A ISOLATED 2017 09:20 PM     All Micro Results     Procedure Component Value Units Date/Time    CULTURE, BLOOD, PAIRED [[id number]] Collected:  10/03/19 2030    Order Status:  Completed Specimen:  Blood Updated:  10/04/19 0621     Special Requests: NO SPECIAL REQUESTS        Culture result: NO GROWTH AFTER 9 HOURS       URINE CULTURE HOLD SAMPLE [[id number]]     Order Status:  Sent Specimen: Urine           Labs: reviewed by myself. Recent Labs     10/03/19  2030   WBC 15.6*   HGB 15.4   HCT 45.1        Recent Labs     10/03/19  2030      K 3.4*      CO2 26   BUN 11   CREA 0.93   *   CA 9.1     Recent Labs     10/03/19  2030   SGOT 10*   ALT 22   *   TBILI 0.7   TP 7.8   ALB 4.3   GLOB 3.5     No results for input(s): INR, PTP, APTT, INREXT in the last 72 hours. No results for input(s): FE, TIBC, PSAT, FERR in the last 72 hours. No results found for: FOL, RBCF   No results for input(s): PH, PCO2, PO2 in the last 72 hours.   Recent Labs     10/04/19  0323   TROIQ <0.05     No results found for: CHOL, CHOLX, CHLST, CHOLV, HDL, HDLP, LDL, LDLC, DLDLP, TGLX, TRIGL, TRIGP, CHHD, CHHDX  No results found for: [facility]  Lab Results   Component Value Date/Time    Color YELLOW 07/06/2017 09:35 PM    Appearance CLEAR 07/06/2017 09:35 PM    Specific gravity >1.030 (H) 07/06/2017 09:35 PM    pH (UA) 7.0 07/06/2017 09:35 PM    Protein TRACE (A) 07/06/2017 09:35 PM    Glucose NEGATIVE  07/06/2017 09:35 PM    Ketone TRACE (A) 07/06/2017 09:35 PM    Bilirubin NEGATIVE  07/06/2017 09:35 PM    Urobilinogen 1.0 07/06/2017 09:35 PM    Nitrites NEGATIVE  07/06/2017 09:35 PM    Leukocyte Esterase NEGATIVE  07/06/2017 09:35 PM    Epithelial cells NEGATIVE  07/06/2017 09:35 PM    Bacteria NEGATIVE  07/06/2017 09:35 PM    WBC NONE 07/06/2017 09:35 PM    RBC NONE 07/06/2017 09:35 PM         Medications Reviewed:     Current Facility-Administered Medications   Medication Dose Route Frequency    0.9% sodium chloride infusion  100 mL/hr IntraVENous CONTINUOUS    sodium chloride (NS) flush 5-40 mL  5-40 mL IntraVENous Q8H    sodium chloride (NS) flush 5-40 mL  5-40 mL IntraVENous PRN    acetaminophen (TYLENOL) tablet 650 mg  650 mg Oral Q4H PRN    ondansetron (ZOFRAN) injection 4 mg  4 mg IntraVENous Q4H PRN    diphenhydrAMINE (BENADRYL) capsule 25 mg  25 mg Oral Q6H PRN    predniSONE (DELTASONE) tablet 20 mg  20 mg Oral DAILY WITH BREAKFAST    calamine-zinc oxide (CALAMINE) 8-8 % solution   Topical QID     No current outpatient medications on file.     ______________________________________________________________________  EXPECTED LENGTH OF STAY: - - -  ACTUAL LENGTH OF STAY:          1                 Elex Dakins, MD     Patient's emergency contacts:  Extended Emergency Contact Information  Primary Emergency Contact: [address] Phone: [phone]  Relation: Other Relative

## 2019-10-04 NOTE — ED NOTES
Mask placed on patient in triage.   Charge nurse made aware of pt's immunization status and complaints

## 2019-10-04 NOTE — ED NOTES
2296:  Report received from [facility]. Patient is in bed, A&O X3, skin is warm and dry, respirations are even and unlabored. Family at bedside, call bell within reach, will continue to monitor. 0530:  Patient is resting with eyes closed, deep even respirations noted. No acute s/s of distress or discomfort. Call bell within reach, will continue to monitor. 5194:  Bedside and Verbal shift change report given to Kate [place] (oncoming nurse) by Shannan Acuña RN (offgoing nurse). Report included the following information SBAR, Kardex, ED Summary and Recent Results.

## 2019-10-04 NOTE — H&P
HISTORY AND PHYSICAL      PCP: Unknown, Provider  History source: the patient translated through a       CC: rash      HPI: 25 y.o man w/ rosacea and acne, unvaccinated status, who presents with a generalized rash. Symptoms began today, he developed a sudden rash on his right arm that spread to the remainder of his body. It is red and pruritic. He took Benedryl without relief. He works as a  and is unsure of any new plant or insect exposures. He recently started taking a medication for his acne that he reports getting from [place] called \"Cefixima,\" brand name Anmax. He reports subjective fever. No cough, sore throat, rhinorrhea, dysuria, penile discharge. PMH/PSH:  Past Medical History:   Diagnosis Date    Rosacea      History reviewed. No pertinent surgical history. Home meds:   Anmax - self-prescribed and unclear how much he's actually taking    Allergies:  No Known Allergies    FH:  History reviewed. No pertinent family history. SH:  Social History     Tobacco Use    Smoking status: Former Smoker     Packs/day: 0.25    Smokeless tobacco: Never Used   Substance Use Topics    Alcohol use: Yes     Comment: Occ   he denied smoking, alcohol or illicit drug use to me    ROS: A comprehensive review of systems was negative except for that written in the HPI.       PHYSICAL EXAM:  Visit Vitals  /76 (BP 1 Location: Left arm, BP Patient Position: At rest)   Pulse (!) 122   Temp 99.6 °F (37.6 °C)   Resp 18   Ht 5' 6\" (1.676 m)   Wt 70.7 kg (155 lb 13.8 oz)   SpO2 99%   BMI 25.16 kg/m²       Gen: NAD, non-toxic  HEENT: anicteric sclerae, normal conjunctiva, oropharynx clear, MM moist  Neck: supple, trachea midline, no adenopathy  Heart: RRR, no MRG, no JVD, no peripheral edema  Lungs: CTA b/l, non-labored respirations  Abd: soft, NT, ND, BS+, no organomegaly  Extr: warm  Skin: dry, diffuse wheels and hives on the entire body, most prominent on the upper extremities and back. Face with acne  Neuro: CN II-XII grossly intact, moves all extremities  Psych: normal mood, appropriate affect      Labs/Imaging:  Recent Results (from the past 24 hour(s))   POC LACTIC ACID    Collection Time: 10/03/19  8:29 PM   Result Value Ref Range    Lactic Acid (POC) 0.88 0.40 - 2.00 mmol/L   CBC WITH AUTOMATED DIFF    Collection Time: 10/03/19  8:30 PM   Result Value Ref Range    WBC 15.6 (H) 4.1 - 11.1 K/uL    RBC 5.10 4. 10 - 5.70 M/uL    HGB 15.4 12.1 - 17.0 g/dL    HCT 45.1 36.6 - 50.3 %    MCV 88.4 80.0 - 99.0 FL    MCH 30.2 26.0 - 34.0 PG    MCHC 34.1 30.0 - 36.5 g/dL    RDW 12.1 11.5 - 14.5 %    PLATELET 750 897 - 659 K/uL    MPV 9.4 8.9 - 12.9 FL    NRBC 0.0 0  WBC    ABSOLUTE NRBC 0.00 0.00 - 0.01 K/uL    NEUTROPHILS 83 (H) 32 - 75 %    LYMPHOCYTES 12 12 - 49 %    MONOCYTES 5 5 - 13 %    EOSINOPHILS 0 0 - 7 %    BASOPHILS 0 0 - 1 %    IMMATURE GRANULOCYTES 0 0.0 - 0.5 %    ABS. NEUTROPHILS 12.9 (H) 1.8 - 8.0 K/UL    ABS. LYMPHOCYTES 1.8 0.8 - 3.5 K/UL    ABS. MONOCYTES 0.8 0.0 - 1.0 K/UL    ABS. EOSINOPHILS 0.0 0.0 - 0.4 K/UL    ABS. BASOPHILS 0.0 0.0 - 0.1 K/UL    ABS. IMM. GRANS. 0.1 (H) 0.00 - 0.04 K/UL    DF AUTOMATED     METABOLIC PANEL, COMPREHENSIVE    Collection Time: 10/03/19  8:30 PM   Result Value Ref Range    Sodium 138 136 - 145 mmol/L    Potassium 3.4 (L) 3.5 - 5.1 mmol/L    Chloride 103 97 - 108 mmol/L    CO2 26 21 - 32 mmol/L    Anion gap 9 5 - 15 mmol/L    Glucose 102 (H) 65 - 100 mg/dL    BUN 11 6 - 20 MG/DL    Creatinine 0.93 0.70 - 1.30 MG/DL    BUN/Creatinine ratio 12 12 - 20      GFR est AA >60 >60 ml/min/1.73m2    GFR est non-AA >60 >60 ml/min/1.73m2    Calcium 9.1 8.5 - 10.1 MG/DL    Bilirubin, total 0.7 0.2 - 1.0 MG/DL    ALT (SGPT) 22 12 - 78 U/L    AST (SGOT) 10 (L) 15 - 37 U/L    Alk.  phosphatase 121 (H) 45 - 117 U/L    Protein, total 7.8 6.4 - 8.2 g/dL    Albumin 4.3 3.5 - 5.0 g/dL    Globulin 3.5 2.0 - 4.0 g/dL    A-G Ratio 1.2 1.1 - 2.2     SAMPLES BEING HELD    Collection Time: 10/03/19  8:30 PM   Result Value Ref Range    SAMPLES BEING HELD 1RED,1BLUE     COMMENT        Add-on orders for these samples will be processed based on acceptable specimen integrity and analyte stability, which may vary by analyte. Recent Labs     10/03/19  2030   WBC 15.6*   HGB 15.4   HCT 45.1        Recent Labs     10/03/19  2030      K 3.4*      CO2 26   BUN 11   CREA 0.93   *   CA 9.1     Recent Labs     10/03/19  2030   SGOT 10*   ALT 22   *   TBILI 0.7   TP 7.8   ALB 4.3   GLOB 3.5       No results for input(s): CPK, CKNDX, TROIQ in the last 72 hours. No lab exists for component: CPKMB    No results for input(s): INR, PTP, APTT, INREXT in the last 72 hours. No results for input(s): PH, PCO2, PO2 in the last 72 hours. Xr Chest Pa Lat    Result Date: 10/3/2019  IMPRESSION: No acute process. Assessment & Plan:     Generalized rash: this appears urticarial and given his work as a  and recent antibiotic (Anmax from [place]) use raises concern for an allergic reaction. He does not appear toxic/infected. Possible viral exanthem.  -admit for observation  -will hold IV antibiotics.  F/u blood cultures  -check HIV Ab and PCR; verbal consent obtained  -receiving IV diphenhydramine and IV methylprednisolone in the ED - monitor response    SIRS without an infection source    Unvaccinated status    DVT ppx: SCDs, up ad rose  Code status: full  Disposition: home when ready    Signed By: Luke Fofana MD     October 4, 2019

## 2019-10-05 VITALS
TEMPERATURE: 98.6 F | HEART RATE: 93 BPM | HEIGHT: 66 IN | RESPIRATION RATE: 16 BRPM | WEIGHT: 155.87 LBS | OXYGEN SATURATION: 96 % | SYSTOLIC BLOOD PRESSURE: 96 MMHG | BODY MASS INDEX: 25.05 KG/M2 | DIASTOLIC BLOOD PRESSURE: 61 MMHG

## 2019-10-05 PROBLEM — R21 RASH: Status: RESOLVED | Noted: 2019-10-04 | Resolved: 2019-10-05

## 2019-10-05 PROBLEM — R65.10 SIRS (SYSTEMIC INFLAMMATORY RESPONSE SYNDROME) (HCC): Status: RESOLVED | Noted: 2019-10-03 | Resolved: 2019-10-05

## 2019-10-05 LAB
ANION GAP SERPL CALC-SCNC: 5 MMOL/L (ref 5–15)
BUN SERPL-MCNC: 17 MG/DL (ref 6–20)
BUN/CREAT SERPL: 22 (ref 12–20)
CALCIUM SERPL-MCNC: 8.1 MG/DL (ref 8.5–10.1)
CHLORIDE SERPL-SCNC: 109 MMOL/L (ref 97–108)
CO2 SERPL-SCNC: 27 MMOL/L (ref 21–32)
CREAT SERPL-MCNC: 0.78 MG/DL (ref 0.7–1.3)
ERYTHROCYTE [DISTWIDTH] IN BLOOD BY AUTOMATED COUNT: 12.5 % (ref 11.5–14.5)
GLUCOSE SERPL-MCNC: 103 MG/DL (ref 65–100)
HCT VFR BLD AUTO: 41.8 % (ref 36.6–50.3)
HGB BLD-MCNC: 13.9 G/DL (ref 12.1–17)
MAGNESIUM SERPL-MCNC: 2.6 MG/DL (ref 1.6–2.4)
MCH RBC QN AUTO: 29.8 PG (ref 26–34)
MCHC RBC AUTO-ENTMCNC: 33.3 G/DL (ref 30–36.5)
MCV RBC AUTO: 89.7 FL (ref 80–99)
NRBC # BLD: 0 K/UL (ref 0–0.01)
NRBC BLD-RTO: 0 PER 100 WBC
PLATELET # BLD AUTO: 266 K/UL (ref 150–400)
PMV BLD AUTO: 9.7 FL (ref 8.9–12.9)
POTASSIUM SERPL-SCNC: 3.6 MMOL/L (ref 3.5–5.1)
RBC # BLD AUTO: 4.66 M/UL (ref 4.1–5.7)
SODIUM SERPL-SCNC: 141 MMOL/L (ref 136–145)
WBC # BLD AUTO: 17.9 K/UL (ref 4.1–11.1)

## 2019-10-05 PROCEDURE — 74011636637 HC RX REV CODE- 636/637: Performed by: INTERNAL MEDICINE

## 2019-10-05 PROCEDURE — 36415 COLL VENOUS BLD VENIPUNCTURE: CPT

## 2019-10-05 PROCEDURE — 85027 COMPLETE CBC AUTOMATED: CPT

## 2019-10-05 PROCEDURE — 80048 BASIC METABOLIC PNL TOTAL CA: CPT

## 2019-10-05 PROCEDURE — 99218 HC RM OBSERVATION: CPT

## 2019-10-05 PROCEDURE — 83735 ASSAY OF MAGNESIUM: CPT

## 2019-10-05 RX ORDER — PREDNISONE 20 MG/1
20 TABLET ORAL
Qty: 5 TAB | Refills: 0 | Status: SHIPPED | OUTPATIENT
Start: 2019-10-06 | End: 2019-10-11

## 2019-10-05 RX ORDER — DIPHENHYDRAMINE HCL 25 MG
25 CAPSULE ORAL
Qty: 20 CAP | Refills: 0 | Status: SHIPPED | OUTPATIENT
Start: 2019-10-05 | End: 2019-10-15

## 2019-10-05 RX ORDER — CAMPHOR
1 SPIRIT TOPICAL 4 TIMES DAILY
Qty: 1 BOTTLE | Refills: 1 | Status: SHIPPED | OUTPATIENT
Start: 2019-10-05

## 2019-10-05 RX ADMIN — Medication 10 ML: at 07:05

## 2019-10-05 RX ADMIN — PREDNISONE 20 MG: 20 TABLET ORAL at 07:04

## 2019-10-05 RX ADMIN — FERRIC OXIDE RED: 8; 8 LOTION TOPICAL at 10:46

## 2019-10-05 NOTE — DISCHARGE INSTRUCTIONS
Discharge Instructions       PATIENT ID: Sav Pete  MRN: [de-identified]   YOB: 1995    DATE OF ADMISSION: 10/3/2019 10:18 PM    DATE OF DISCHARGE: 10/5/2019    PRIMARY CARE PROVIDER: Unknown, Provider     ATTENDING PHYSICIAN: Gonzalez Murphy MD  DISCHARGING PROVIDER: Elex Dakins, MD    To contact this individual call [phone] and ask the  to page. If unavailable ask to be transferred the Adult Hospitalist Department. DISCHARGE DIAGNOSES   Diffuse rash: resolved  Likely allergic or viral exanthem. For Acne and allergic rash, please see a dermatologist.  Cont short course of steroids and Benadryl  Patient is advised to return to ER/seek medical care if symptoms recur/ worsen or new symptoms develop. CONSULTATIONS: IP CONSULT TO INFECTIOUS DISEASES    PROCEDURES/SURGERIES: * No surgery found *    PENDING TEST RESULTS:   At the time of discharge the following test results are still pending: n/a    FOLLOW UP APPOINTMENTS:   Follow-up Information     Follow up With Specialties Details Why Contact Info    [facility]  Schedule an appointment as soon as possible for a visit as recommended [address] [place] [address] [address]:   · It is important that you take the medication exactly as they are prescribed. · Keep your medication in the bottles provided by the pharmacist and keep a list of the medication names, dosages, and times to be taken in your wallet. · Do not take other medications without consulting your doctor. · No drinking alcohol or driving car or operating machinery if you are on narcotic pain medications. Donot take sedating mediations if you are sleepy or confused.    · Fall Precautions  · Keep Well Hydrated  · Report to your medical provider if you feel you have  developed allergies to medications  · Follow up with your PCP or Consultant for medication adjustments and refills  · Monitor for signs of fevers,chills,bleeding,chest pain and seek medical attention if you do so. DIET: Regular Diet    ACTIVITY: Ambulate in house    WOUND CARE: n/a    EQUIPMENT needed: n/a    DISCHARGE MEDICATIONS:   See Medication Reconciliation Form    · It is important that you take the medication exactly as they are prescribed. · Keep your medication in the bottles provided by the pharmacist and keep a list of the medication names, dosages, and times to be taken in your wallet. · Do not take other medications without consulting your doctor. NOTIFY YOUR PHYSICIAN FOR ANY OF THE FOLLOWING:   Fever over 101 degrees for 24 hours. Chest pain, shortness of breath, fever, chills, nausea, vomiting, diarrhea, change in mentation, falling, weakness, bleeding. Severe pain or pain not relieved by medications. Or, any other signs or symptoms that you may have questions about. DISPOSITION:  x  Home With:   OT  PT  HH  RN       SNF/Inpatient Rehab/LTAC    Independent/assisted living    Hospice    Other:         Information obtained by :   I understand that if any problems occur once I am at home I am to contact my physician. I understand and acknowledge receipt of the instructions indicated above.                                                                                                                                              [de-identified] or R.N.'s Signature                                                                  Date/Time                                                                                                                                              Patient or Representative Signature                                                          Date/Time          Signed:   Sha Medrano MD  10/5/2019  9:12 AM

## 2019-10-05 NOTE — PROGRESS NOTES
Observation notice provided in writing to patient and/or caregiver as well as verbal explanation of the policy. Patients who are in outpatient status also receive the Observation notice. Patient was given and signed the [facility] Observation letter. . palced in chart    CM gave patient a Care card Application to complete.       [facility] transporting home

## 2019-10-05 NOTE — PROGRESS NOTES
Bedside and Verbal shift change report given to HITESH Monae (oncoming nurse) by Sophia Sánchez (offgoing nurse). Report included the following information SBAR, Kardex, ED Summary, Procedure Summary, Intake/Output, MAR and Recent Results.

## 2019-10-05 NOTE — PROGRESS NOTES
Bedside and Verbal shift change report given to Gonzalo Arguelles (oncoming nurse) by Drinda Simmonds RN (offgoing nurse). Report included the following information SBAR, Kardex and MAR.

## 2019-10-05 NOTE — DISCHARGE SUMMARY
Inpatient hospitalist discharge summary                Brief Overview    PATIENT ID: Samanta Monday    MRN: [[id number]]     YOB: 1995    Admitting Provider: Ishaan Oro MD    Discharging Provider: Britney Fernandez MD   To contact this individual call [phone] and ask the  to page. If unavailable ask to be transferred the Adult Hospitalist Department. PCP at discharge: Unknown, Provider None   Patient not available to ask    Admission date: 10/3/2019  Date of Discharge: 10/05/19    Chief complaint:   Chief Complaint   Patient presents with    Rash    Fever     Patient Active Problem List   Diagnosis Code   (none) - all problems resolved or deleted         Discharge diagnosis, hospital course/plan:  Generalized rash: this appears urticarial and given his work as a  and recent antibiotic (Anmax from [place]) use raises concern for an allergic reaction. He does not appear toxic/infected. Possible viral exanthem. - Neg HIV Ab  -responded well to po steroids and benadryl, will give a short course on DC.  - clinical images taken with patient's consent and placed in the chart.     SIRS without an infection source  Resolved  BC x 2 NGTD  pro calcitonin level and SED rate WNL    Leukocytosis: sec to steroids     Unvaccinated status       On the date of discharge, diagnostic face to face encounter was performed. Patient was hemodynamically stable, offering no new complaints. Denies any shortness of breath at rest, no fevers or chills, no diarrhea or constipation. Patient is agreeable for discharge. Patient understood and verbalized the understanding of the discharge plan. Patient was advised to seek medical help/ care or return to ED, if symptoms recur, worsen or new symptoms develop.       Discharge Disposition:  Home or Self Care    Discharge activity:  Ambulate in house    Code status at discharge:  Full Code     Active issues requiring follow up:  [facility]  Acne    Outpatient follow up:  FU with a dermatologist      Future appointments-  No future appointments. Follow-up Information     Follow up With Specialties Details Why Contact Mk    [facility]  Schedule an appointment as soon as possible for a visit as recommended [address] [facility]  [id number] [place] [phone] [facility]          Test results pending upon discharge:  n/a    Operative procedures performed:      Treatments: IV hydration and steroids: prednisone    Consults:  IP CONSULT TO INFECTIOUS DISEASES    Procedures:    * No surgery found *    Diet:  DIET REGULAR  DIET ONE TIME MESSAGE  DIET ONE TIME MESSAGE    Pertinent test results:  Xr Chest Pa Lat    Result Date: 10/3/2019  INDICATION:   fever COMPARISON: None FINDINGS: Frontal and lateral views of the chest demonstrate a normal cardiomediastinal silhouette. The lungs are adequately expanded. There is no edema, effusion, consolidation, or pneumothorax. The osseous structures are unremarkable. IMPRESSION: No acute process. Recent Results (from the past 336 hour(s))   POC LACTIC ACID    Collection Time: 10/03/19  8:29 PM   Result Value Ref Range    Lactic Acid (POC) 0.88 0.40 - 2.00 mmol/L   URINE CULTURE HOLD SAMPLE    Collection Time: 10/03/19  8:29 PM   Result Value Ref Range    Urine culture hold        URINE ON HOLD IN MICROBIOLOGY DEPT FOR 3 DAYS. IF UNPRESERVED URINE IS SUBMITTED, IT CANNOT BE USED FOR ADDITIONAL TESTING AFTER 24 HRS, RECOLLECTION WILL BE REQUIRED. CBC WITH AUTOMATED DIFF    Collection Time: 10/03/19  8:30 PM   Result Value Ref Range    WBC 15.6 (H) 4.1 - 11.1 K/uL    RBC 5.10 4. 10 - 5.70 M/uL    HGB 15.4 12.1 - 17.0 g/dL    HCT 45.1 36.6 - 50.3 %    MCV 88.4 80.0 - 99.0 FL    MCH 30.2 26.0 - 34.0 PG    MCHC 34.1 30.0 - 36.5 g/dL    RDW 12.1 11.5 - 14.5 %    PLATELET 223 200 - 462 K/uL    MPV 9.4 8.9 - 12.9 FL    NRBC 0.0 0  WBC    ABSOLUTE NRBC 0.00 0.00 - 0.01 K/uL    NEUTROPHILS 83 (H) 32 - 75 %    LYMPHOCYTES 12 12 - 49 %    MONOCYTES 5 5 - 13 %    EOSINOPHILS 0 0 - 7 %    BASOPHILS 0 0 - 1 %    IMMATURE GRANULOCYTES 0 0.0 - 0.5 %    ABS. NEUTROPHILS 12.9 (H) 1.8 - 8.0 K/UL    ABS. LYMPHOCYTES 1.8 0.8 - 3.5 K/UL    ABS. MONOCYTES 0.8 0.0 - 1.0 K/UL    ABS. EOSINOPHILS 0.0 0.0 - 0.4 K/UL    ABS. BASOPHILS 0.0 0.0 - 0.1 K/UL    ABS. IMM. GRANS. 0.1 (H) 0.00 - 0.04 K/UL    DF AUTOMATED     METABOLIC PANEL, COMPREHENSIVE    Collection Time: 10/03/19  8:30 PM   Result Value Ref Range    Sodium 138 136 - 145 mmol/L    Potassium 3.4 (L) 3.5 - 5.1 mmol/L    Chloride 103 97 - 108 mmol/L    CO2 26 21 - 32 mmol/L    Anion gap 9 5 - 15 mmol/L    Glucose 102 (H) 65 - 100 mg/dL    BUN 11 6 - 20 MG/DL    Creatinine 0.93 0.70 - 1.30 MG/DL    BUN/Creatinine ratio 12 12 - 20      GFR est AA >60 >60 ml/min/1.73m2    GFR est non-AA >60 >60 ml/min/1.73m2    Calcium 9.1 8.5 - 10.1 MG/DL    Bilirubin, total 0.7 0.2 - 1.0 MG/DL    ALT (SGPT) 22 12 - 78 U/L    AST (SGOT) 10 (L) 15 - 37 U/L    Alk. phosphatase 121 (H) 45 - 117 U/L    Protein, total 7.8 6.4 - 8.2 g/dL    Albumin 4.3 3.5 - 5.0 g/dL    Globulin 3.5 2.0 - 4.0 g/dL    A-G Ratio 1.2 1.1 - 2.2     CULTURE, BLOOD, PAIRED    Collection Time: 10/03/19  8:30 PM   Result Value Ref Range    Special Requests: NO SPECIAL REQUESTS      Culture result: NO GROWTH 2 DAYS     SAMPLES BEING HELD    Collection Time: 10/03/19  8:30 PM   Result Value Ref Range    SAMPLES BEING HELD 1RED,1BLUE     COMMENT        Add-on orders for these samples will be processed based on acceptable specimen integrity and analyte stability, which may vary by analyte.    EKG, 12 LEAD, INITIAL    Collection Time: 10/04/19 12:20 AM   Result Value Ref Range    Ventricular Rate 101 BPM    Atrial Rate 101 BPM    P-R Interval 136 ms    QRS Duration 96 ms    Q-T Interval 330 ms    QTC Calculation (Bezet) 427 ms    Calculated P Axis 63 degrees    Calculated R Axis 100 degrees    Calculated T Axis 21 degrees    Diagnosis       Sinus tachycardia  No previous ECGs available  Confirmed by Nathen Mckinney M.D., Debora Humberto ([id number]) on 10/4/2019 10:46:20 AM     HIV 1/2 AG/AB, 4TH GENERATION,W RFLX CONFIRM    Collection Time: 10/04/19  3:22 AM   Result Value Ref Range    HIV 1/2 Interpretation NONREACTIVE NR      HIV 1/2 result comment SEE NOTE     SAMPLES BEING HELD    Collection Time: 10/04/19  3:22 AM   Result Value Ref Range    SAMPLES BEING HELD 1LAV     COMMENT        Add-on orders for these samples will be processed based on acceptable specimen integrity and analyte stability, which may vary by analyte. TROPONIN I    Collection Time: 10/04/19  3:23 AM   Result Value Ref Range    Troponin-I, Qt. <0.05 <0.05 ng/mL   PROCALCITONIN    Collection Time: 10/04/19  8:47 AM   Result Value Ref Range    Procalcitonin <0.1 ng/mL   SED RATE (ESR)    Collection Time: 10/04/19  8:47 AM   Result Value Ref Range    Sed rate, automated 2 0 - 15 mm/hr   C REACTIVE PROTEIN, QT    Collection Time: 10/04/19  8:47 AM   Result Value Ref Range    C-Reactive protein 6.64 (H) 0.00 - 0.60 mg/dL   CBC WITH AUTOMATED DIFF    Collection Time: 10/04/19  8:47 AM   Result Value Ref Range    WBC 11.6 (H) 4.1 - 11.1 K/uL    RBC 4.92 4.10 - 5.70 M/uL    HGB 15.0 12.1 - 17.0 g/dL    HCT 44.2 36.6 - 50.3 %    MCV 89.8 80.0 - 99.0 FL    MCH 30.5 26.0 - 34.0 PG    MCHC 33.9 30.0 - 36.5 g/dL    RDW 12.2 11.5 - 14.5 %    PLATELET 541 488 - 588 K/uL    MPV 9.6 8.9 - 12.9 FL    NRBC 0.0 0  WBC    ABSOLUTE NRBC 0.00 0.00 - 0.01 K/uL    NEUTROPHILS 87 (H) 32 - 75 %    LYMPHOCYTES 11 (L) 12 - 49 %    MONOCYTES 1 (L) 5 - 13 %    EOSINOPHILS 0 0 - 7 %    BASOPHILS 0 0 - 1 %    IMMATURE GRANULOCYTES 1 (H) 0.0 - 0.5 %    ABS. NEUTROPHILS 10.1 (H) 1.8 - 8.0 K/UL    ABS. LYMPHOCYTES 1.3 0.8 - 3.5 K/UL    ABS. MONOCYTES 0.2 0.0 - 1.0 K/UL    ABS. EOSINOPHILS 0.0 0.0 - 0.4 K/UL    ABS. BASOPHILS 0.0 0.0 - 0.1 K/UL    ABS. IMM.  GRANS. 0.1 (H) 0.00 - 0.04 K/UL    DF AUTOMATED     DRUG SCREEN, URINE    Collection Time: 10/04/19 10:52 AM   Result Value Ref Range    AMPHETAMINES NEGATIVE  NEG      BARBITURATES NEGATIVE  NEG      BENZODIAZEPINES NEGATIVE  NEG      COCAINE NEGATIVE  NEG      METHADONE NEGATIVE  NEG      OPIATES NEGATIVE  NEG      PCP(PHENCYCLIDINE) NEGATIVE  NEG      THC (TH-CANNABINOL) NEGATIVE  NEG      Drug screen comment (NOTE)    URINALYSIS W/MICROSCOPIC    Collection Time: 10/04/19 12:36 PM   Result Value Ref Range    Color YELLOW/STRAW      Appearance CLEAR CLEAR      Specific gravity 1.010 1.003 - 1.030      pH (UA) 6.5 5.0 - 8.0      Protein NEGATIVE  NEG mg/dL    Glucose NEGATIVE  NEG mg/dL    Ketone NEGATIVE  NEG mg/dL    Bilirubin NEGATIVE  NEG      Blood NEGATIVE  NEG      Urobilinogen 1.0 0.2 - 1.0 EU/dL    Nitrites NEGATIVE  NEG      Leukocyte Esterase NEGATIVE  NEG      WBC 0-4 0 - 4 /hpf    RBC 0-5 0 - 5 /hpf    Epithelial cells FEW FEW /lpf    Bacteria NEGATIVE  NEG /hpf    Hyaline cast 0-2 0 - 5 /lpf   URINE CULTURE HOLD SAMPLE    Collection Time: 10/04/19 12:36 PM   Result Value Ref Range    Urine culture hold        URINE ON HOLD IN MICROBIOLOGY DEPT FOR 3 DAYS. IF UNPRESERVED URINE IS SUBMITTED, IT CANNOT BE USED FOR ADDITIONAL TESTING AFTER 24 HRS, RECOLLECTION WILL BE REQUIRED.    CBC W/O DIFF    Collection Time: 10/05/19  5:27 AM   Result Value Ref Range    WBC 17.9 (H) 4.1 - 11.1 K/uL    RBC 4.66 4.10 - 5.70 M/uL    HGB 13.9 12.1 - 17.0 g/dL    HCT 41.8 36.6 - 50.3 %    MCV 89.7 80.0 - 99.0 FL    MCH 29.8 26.0 - 34.0 PG    MCHC 33.3 30.0 - 36.5 g/dL    RDW 12.5 11.5 - 14.5 %    PLATELET 838 828 - 612 K/uL    MPV 9.7 8.9 - 12.9 FL    NRBC 0.0 0  WBC    ABSOLUTE NRBC 0.00 0.00 - 5.10 K/uL   METABOLIC PANEL, BASIC    Collection Time: 10/05/19  5:27 AM   Result Value Ref Range    Sodium 141 136 - 145 mmol/L    Potassium 3.6 3.5 - 5.1 mmol/L    Chloride 109 (H) 97 - 108 mmol/L    CO2 27 21 - 32 mmol/L    Anion gap 5 5 - 15 mmol/L    Glucose 103 (H) 65 - 100 mg/dL    BUN 17 6 - 20 MG/DL    Creatinine 0.78 0.70 - 1.30 MG/DL    BUN/Creatinine ratio 22 (H) 12 - 20      GFR est AA >60 >60 ml/min/1.73m2    GFR est non-AA >60 >60 ml/min/1.73m2    Calcium 8.1 (L) 8.5 - 10.1 MG/DL   MAGNESIUM    Collection Time: 10/05/19  5:27 AM   Result Value Ref Range    Magnesium 2.6 (H) 1.6 - 2.4 mg/dL           Physical Exam on Discharge:    Discharge condition: good    Vital signs:   Patient Vitals for the past 12 hrs:   Temp Pulse Resp BP SpO2   10/05/19 0515 98 °F (36.7 °C) (!) 105 14 100/54 93 %   10/04/19 2141 97.5 °F (36.4 °C) 97 15 112/67 97 %       General appearance: alert, cooperative, no distress, appears stated age  Lungs: clear to auscultation bilaterally  Skin: rash has almost resolved    Current Discharge Medication List      START taking these medications    Details   calamine-zinc oxide (CALAMINE) solution Apply 1 Each to affected area four (4) times daily. Qty: 1 Bottle, Refills: 1      diphenhydrAMINE (BENADRYL) 25 mg capsule Take 1 Cap by mouth every six (6) hours as needed for Itching for up to 10 days. Qty: 20 Cap, Refills: 0      predniSONE (DELTASONE) 20 mg tablet Take 20 mg by mouth daily (with breakfast) for 5 days. Qty: 5 Tab, Refills: 0         STOP taking these medications       OTHER Comments:   Reason for Stopping:                  Total time spent on discharge planning, counseling and co-ordination of care:   21 minutes    Feliz Carter MD  10/05/19  9:13 AM

## 2019-10-05 NOTE — PROGRESS NOTES
I have reviewed discharge instructions with the patient. The patient verbalized understanding and had the opportunity to ask questions. Patient has all belongings including clothes and cell phone. A signed copy of the patient's after visit summary is with the patient's chart.

## 2019-10-06 LAB
HIV1 RNA # SERPL NAA+PROBE: <20 COPIES/ML
HIV1 RNA SERPL NAA+PROBE-LOG#: NORMAL LOG10COPY/ML

## 2019-10-08 LAB
BACTERIA SPEC CULT: NORMAL
SERVICE CMNT-IMP: NORMAL

## 2020-09-18 NOTE — PROGRESS NOTES
Admission Medication Reconciliation:    Information obtained from:  Patient via "[employer]     Comments/Recommendations: Updated PTA meds/reviewed patient's allergies. Medication changes (since last review): Added  - antibiotic from [place]. Last dose taken on Monday. ¹[employer] pharmacy benefit data reflects medications filled and processed through the patient's insurance, however   this data does NOT capture whether the medication was picked up or is currently being taken by the patient. Allergies:  Patient has no known allergies. Significant PMH/Disease States:   Past Medical History:   Diagnosis Date    Rosacea      Chief Complaint for this Admission:    Chief Complaint   Patient presents with    Rash    Fever     Prior to Admission Medications:   Prior to Admission Medications   Prescriptions Last Dose Informant Patient Reported? Taking? OTHER 9/30/2019  Yes Yes   Sig: Take 1 Tab by mouth daily.  Antibiotic from [place] \"Anmax\" (cefixima) 400mg      Facility-Administered Medications: None Graft Basting Suture (Optional): 5-0 Prolene